# Patient Record
Sex: MALE | Race: WHITE | NOT HISPANIC OR LATINO | URBAN - METROPOLITAN AREA
[De-identification: names, ages, dates, MRNs, and addresses within clinical notes are randomized per-mention and may not be internally consistent; named-entity substitution may affect disease eponyms.]

---

## 2017-01-29 ENCOUNTER — COMMUNICATION - HEALTHEAST (OUTPATIENT)
Dept: INTERNAL MEDICINE | Facility: CLINIC | Age: 47
End: 2017-01-29

## 2017-01-30 ENCOUNTER — AMBULATORY - HEALTHEAST (OUTPATIENT)
Dept: INTERNAL MEDICINE | Facility: CLINIC | Age: 47
End: 2017-01-30

## 2017-02-02 ENCOUNTER — OFFICE VISIT - HEALTHEAST (OUTPATIENT)
Dept: VASCULAR SURGERY | Facility: CLINIC | Age: 47
End: 2017-02-02

## 2017-02-02 DIAGNOSIS — Z72.0 TOBACCO ABUSE: ICD-10-CM

## 2017-02-02 DIAGNOSIS — E66.09 NON MORBID OBESITY DUE TO EXCESS CALORIES: ICD-10-CM

## 2017-02-02 DIAGNOSIS — I87.303 VENOUS HYPERTENSION OF BOTH LOWER EXTREMITIES: ICD-10-CM

## 2017-02-02 DIAGNOSIS — R53.83 FATIGUE: ICD-10-CM

## 2017-02-02 DIAGNOSIS — R73.9 HYPERGLYCEMIA, UNSPECIFIED: ICD-10-CM

## 2017-02-02 DIAGNOSIS — E55.9 VITAMIN D DEFICIENCY: ICD-10-CM

## 2017-02-02 DIAGNOSIS — G62.9 PERIPHERAL NEUROPATHY: ICD-10-CM

## 2017-02-02 DIAGNOSIS — M79.89 LEG SWELLING: ICD-10-CM

## 2017-02-07 ENCOUNTER — COMMUNICATION - HEALTHEAST (OUTPATIENT)
Dept: SURGERY | Facility: CLINIC | Age: 47
End: 2017-02-07

## 2017-02-26 ENCOUNTER — AMBULATORY - HEALTHEAST (OUTPATIENT)
Dept: INTERNAL MEDICINE | Facility: CLINIC | Age: 47
End: 2017-02-26

## 2017-02-26 DIAGNOSIS — Z79.899 ENCOUNTER FOR LONG-TERM (CURRENT) USE OF OTHER MEDICATIONS: ICD-10-CM

## 2017-02-26 DIAGNOSIS — R73.9 HYPERGLYCEMIA, UNSPECIFIED: ICD-10-CM

## 2017-03-17 ENCOUNTER — COMMUNICATION - HEALTHEAST (OUTPATIENT)
Dept: SURGERY | Facility: CLINIC | Age: 47
End: 2017-03-17

## 2017-08-16 ENCOUNTER — OFFICE VISIT - HEALTHEAST (OUTPATIENT)
Dept: RHEUMATOLOGY | Facility: CLINIC | Age: 47
End: 2017-08-16

## 2017-08-16 DIAGNOSIS — M25.562 CHRONIC PAIN OF BOTH KNEES: ICD-10-CM

## 2017-08-16 DIAGNOSIS — M25.561 CHRONIC PAIN OF BOTH KNEES: ICD-10-CM

## 2017-08-16 DIAGNOSIS — M11.20 CHONDROCALCINOSIS: ICD-10-CM

## 2017-08-16 DIAGNOSIS — G89.29 CHRONIC PAIN OF BOTH KNEES: ICD-10-CM

## 2017-08-16 LAB
ALT SERPL W P-5'-P-CCNC: 35 U/L (ref 0–45)
CREAT SERPL-MCNC: 0.95 MG/DL (ref 0.7–1.3)
GFR SERPL CREATININE-BSD FRML MDRD: >60 ML/MIN/1.73M2

## 2017-08-16 ASSESSMENT — MIFFLIN-ST. JEOR: SCORE: 2206.63

## 2017-10-22 ENCOUNTER — COMMUNICATION - HEALTHEAST (OUTPATIENT)
Dept: RHEUMATOLOGY | Facility: CLINIC | Age: 47
End: 2017-10-22

## 2017-10-22 DIAGNOSIS — M25.562 CHRONIC PAIN OF BOTH KNEES: ICD-10-CM

## 2017-10-22 DIAGNOSIS — G89.29 CHRONIC PAIN OF BOTH KNEES: ICD-10-CM

## 2017-10-22 DIAGNOSIS — M11.20 CHONDROCALCINOSIS: ICD-10-CM

## 2017-10-22 DIAGNOSIS — M25.561 CHRONIC PAIN OF BOTH KNEES: ICD-10-CM

## 2017-11-26 ENCOUNTER — COMMUNICATION - HEALTHEAST (OUTPATIENT)
Dept: RHEUMATOLOGY | Facility: CLINIC | Age: 47
End: 2017-11-26

## 2017-11-26 DIAGNOSIS — M11.20 CHONDROCALCINOSIS: ICD-10-CM

## 2017-11-26 DIAGNOSIS — G89.29 CHRONIC PAIN OF BOTH KNEES: ICD-10-CM

## 2017-11-26 DIAGNOSIS — M25.561 CHRONIC PAIN OF BOTH KNEES: ICD-10-CM

## 2017-11-26 DIAGNOSIS — M25.562 CHRONIC PAIN OF BOTH KNEES: ICD-10-CM

## 2018-01-08 ENCOUNTER — COMMUNICATION - HEALTHEAST (OUTPATIENT)
Dept: RHEUMATOLOGY | Facility: CLINIC | Age: 48
End: 2018-01-08

## 2018-01-08 DIAGNOSIS — G89.29 CHRONIC PAIN OF BOTH KNEES: ICD-10-CM

## 2018-01-08 DIAGNOSIS — M11.20 CHONDROCALCINOSIS: ICD-10-CM

## 2018-01-08 DIAGNOSIS — M25.561 CHRONIC PAIN OF BOTH KNEES: ICD-10-CM

## 2018-01-08 DIAGNOSIS — M25.562 CHRONIC PAIN OF BOTH KNEES: ICD-10-CM

## 2018-02-20 ENCOUNTER — OFFICE VISIT - HEALTHEAST (OUTPATIENT)
Dept: RHEUMATOLOGY | Facility: CLINIC | Age: 48
End: 2018-02-20

## 2018-02-20 DIAGNOSIS — G89.29 CHRONIC PAIN OF BOTH KNEES: ICD-10-CM

## 2018-02-20 DIAGNOSIS — M25.562 CHRONIC PAIN OF BOTH KNEES: ICD-10-CM

## 2018-02-20 DIAGNOSIS — M17.0 BILATERAL PRIMARY OSTEOARTHRITIS OF KNEE: ICD-10-CM

## 2018-02-20 DIAGNOSIS — M25.561 CHRONIC PAIN OF BOTH KNEES: ICD-10-CM

## 2018-02-20 DIAGNOSIS — M11.20 CHONDROCALCINOSIS: ICD-10-CM

## 2018-02-20 LAB
ALBUMIN SERPL-MCNC: 3.6 G/DL (ref 3.5–5)
ALT SERPL W P-5'-P-CCNC: 61 U/L (ref 0–45)
CREAT SERPL-MCNC: 1 MG/DL (ref 0.7–1.3)
ERYTHROCYTE [DISTWIDTH] IN BLOOD BY AUTOMATED COUNT: 10.7 % (ref 11–14.5)
GFR SERPL CREATININE-BSD FRML MDRD: >60 ML/MIN/1.73M2
HCT VFR BLD AUTO: 45.9 % (ref 40–54)
HGB BLD-MCNC: 15.8 G/DL (ref 14–18)
MCH RBC QN AUTO: 30.6 PG (ref 27–34)
MCHC RBC AUTO-ENTMCNC: 34.4 G/DL (ref 32–36)
MCV RBC AUTO: 89 FL (ref 80–100)
PLATELET # BLD AUTO: 213 THOU/UL (ref 140–440)
PMV BLD AUTO: 8.2 FL (ref 7–10)
RBC # BLD AUTO: 5.16 MILL/UL (ref 4.4–6.2)
WBC: 6.1 THOU/UL (ref 4–11)

## 2018-02-20 RX ORDER — DICLOFENAC SODIUM 75 MG/1
75 TABLET, DELAYED RELEASE ORAL 2 TIMES DAILY
Qty: 180 TABLET | Refills: 1 | Status: SHIPPED | OUTPATIENT
Start: 2018-02-20 | End: 2018-05-21

## 2018-02-20 ASSESSMENT — MIFFLIN-ST. JEOR: SCORE: 2206.63

## 2018-07-05 ENCOUNTER — COMMUNICATION - HEALTHEAST (OUTPATIENT)
Dept: RHEUMATOLOGY | Facility: CLINIC | Age: 48
End: 2018-07-05

## 2018-07-05 DIAGNOSIS — M25.561 CHRONIC PAIN OF BOTH KNEES: ICD-10-CM

## 2018-07-05 DIAGNOSIS — M11.20 CHONDROCALCINOSIS: ICD-10-CM

## 2018-07-05 DIAGNOSIS — M25.562 CHRONIC PAIN OF BOTH KNEES: ICD-10-CM

## 2018-07-05 DIAGNOSIS — G89.29 CHRONIC PAIN OF BOTH KNEES: ICD-10-CM

## 2018-08-27 ENCOUNTER — OFFICE VISIT - HEALTHEAST (OUTPATIENT)
Dept: INTERNAL MEDICINE | Facility: CLINIC | Age: 48
End: 2018-08-27

## 2018-08-27 DIAGNOSIS — R73.9 HYPERGLYCEMIA: ICD-10-CM

## 2018-08-27 DIAGNOSIS — R35.0 URINARY FREQUENCY: ICD-10-CM

## 2018-08-27 DIAGNOSIS — E66.01 MORBID OBESITY (H): ICD-10-CM

## 2018-08-27 DIAGNOSIS — I10 ESSENTIAL HYPERTENSION: ICD-10-CM

## 2018-08-27 DIAGNOSIS — E55.9 VITAMIN D DEFICIENCY: ICD-10-CM

## 2018-08-27 DIAGNOSIS — Z72.0 TOBACCO ABUSE: ICD-10-CM

## 2018-08-27 DIAGNOSIS — R79.89 OTHER SPECIFIED ABNORMAL FINDINGS OF BLOOD CHEMISTRY: ICD-10-CM

## 2018-08-27 LAB
ALBUMIN SERPL-MCNC: 3.8 G/DL (ref 3.5–5)
ALBUMIN UR-MCNC: NEGATIVE MG/DL
ALP SERPL-CCNC: 86 U/L (ref 45–120)
ALT SERPL W P-5'-P-CCNC: 46 U/L (ref 0–45)
ANION GAP SERPL CALCULATED.3IONS-SCNC: 9 MMOL/L (ref 5–18)
APPEARANCE UR: CLEAR
AST SERPL W P-5'-P-CCNC: 29 U/L (ref 0–40)
BACTERIA #/AREA URNS HPF: ABNORMAL HPF
BILIRUB SERPL-MCNC: 0.5 MG/DL (ref 0–1)
BILIRUB UR QL STRIP: NEGATIVE
BUN SERPL-MCNC: 14 MG/DL (ref 8–22)
CALCIUM SERPL-MCNC: 9.8 MG/DL (ref 8.5–10.5)
CHLORIDE BLD-SCNC: 104 MMOL/L (ref 98–107)
CO2 SERPL-SCNC: 26 MMOL/L (ref 22–31)
COLOR UR AUTO: YELLOW
CREAT SERPL-MCNC: 1.01 MG/DL (ref 0.7–1.3)
GFR SERPL CREATININE-BSD FRML MDRD: >60 ML/MIN/1.73M2
GLUCOSE BLD-MCNC: 210 MG/DL (ref 70–125)
GLUCOSE UR STRIP-MCNC: ABNORMAL MG/DL
HBA1C MFR BLD: 8.5 % (ref 3.5–6)
HGB UR QL STRIP: ABNORMAL
KETONES UR STRIP-MCNC: NEGATIVE MG/DL
LEUKOCYTE ESTERASE UR QL STRIP: NEGATIVE
NITRATE UR QL: NEGATIVE
PH UR STRIP: 6 [PH] (ref 5–8)
POTASSIUM BLD-SCNC: 4.3 MMOL/L (ref 3.5–5)
PROT SERPL-MCNC: 7.4 G/DL (ref 6–8)
RBC #/AREA URNS AUTO: ABNORMAL HPF
SODIUM SERPL-SCNC: 139 MMOL/L (ref 136–145)
SP GR UR STRIP: 1.02 (ref 1–1.03)
SQUAMOUS #/AREA URNS AUTO: ABNORMAL LPF
UROBILINOGEN UR STRIP-ACNC: ABNORMAL
WBC #/AREA URNS AUTO: ABNORMAL HPF

## 2018-08-29 ENCOUNTER — AMBULATORY - HEALTHEAST (OUTPATIENT)
Dept: INTERNAL MEDICINE | Facility: CLINIC | Age: 48
End: 2018-08-29

## 2018-08-29 ENCOUNTER — COMMUNICATION - HEALTHEAST (OUTPATIENT)
Dept: INTERNAL MEDICINE | Facility: CLINIC | Age: 48
End: 2018-08-29

## 2018-09-19 ENCOUNTER — OFFICE VISIT - HEALTHEAST (OUTPATIENT)
Dept: SLEEP MEDICINE | Facility: CLINIC | Age: 48
End: 2018-09-19

## 2018-09-19 DIAGNOSIS — R06.83 SNORING: ICD-10-CM

## 2018-09-19 DIAGNOSIS — Z91.89 AT RISK FOR SLEEP APNEA: ICD-10-CM

## 2018-09-19 DIAGNOSIS — G47.10 HYPERSOMNIA: ICD-10-CM

## 2018-09-19 RX ORDER — DICLOFENAC SODIUM 75 MG/1
75 TABLET, DELAYED RELEASE ORAL 2 TIMES DAILY
Status: SHIPPED | COMMUNITY
Start: 2018-09-19

## 2018-09-19 ASSESSMENT — MIFFLIN-ST. JEOR: SCORE: 2215.7

## 2018-10-08 ENCOUNTER — RECORDS - HEALTHEAST (OUTPATIENT)
Dept: ADMINISTRATIVE | Facility: OTHER | Age: 48
End: 2018-10-08

## 2018-10-08 ENCOUNTER — RECORDS - HEALTHEAST (OUTPATIENT)
Dept: SLEEP MEDICINE | Facility: CLINIC | Age: 48
End: 2018-10-08

## 2018-10-08 DIAGNOSIS — Z91.89 OTHER SPECIFIED PERSONAL RISK FACTORS, NOT ELSEWHERE CLASSIFIED: ICD-10-CM

## 2018-10-08 DIAGNOSIS — R06.83 SNORING: ICD-10-CM

## 2018-10-08 DIAGNOSIS — G47.10 HYPERSOMNIA, UNSPECIFIED: ICD-10-CM

## 2018-10-15 ENCOUNTER — COMMUNICATION - HEALTHEAST (OUTPATIENT)
Dept: SLEEP MEDICINE | Facility: CLINIC | Age: 48
End: 2018-10-15

## 2018-10-15 ENCOUNTER — AMBULATORY - HEALTHEAST (OUTPATIENT)
Dept: SLEEP MEDICINE | Facility: CLINIC | Age: 48
End: 2018-10-15

## 2018-10-31 ENCOUNTER — OFFICE VISIT - HEALTHEAST (OUTPATIENT)
Dept: SLEEP MEDICINE | Facility: CLINIC | Age: 48
End: 2018-10-31

## 2018-10-31 ENCOUNTER — AMBULATORY - HEALTHEAST (OUTPATIENT)
Dept: SLEEP MEDICINE | Facility: CLINIC | Age: 48
End: 2018-10-31

## 2018-10-31 DIAGNOSIS — G47.33 OSA (OBSTRUCTIVE SLEEP APNEA): ICD-10-CM

## 2018-10-31 ASSESSMENT — MIFFLIN-ST. JEOR: SCORE: 2211.17

## 2018-11-09 ENCOUNTER — AMBULATORY - HEALTHEAST (OUTPATIENT)
Dept: SLEEP MEDICINE | Facility: CLINIC | Age: 48
End: 2018-11-09

## 2018-12-28 ENCOUNTER — COMMUNICATION - HEALTHEAST (OUTPATIENT)
Dept: INTERNAL MEDICINE | Facility: CLINIC | Age: 48
End: 2018-12-28

## 2019-01-10 ENCOUNTER — OFFICE VISIT - HEALTHEAST (OUTPATIENT)
Dept: SLEEP MEDICINE | Facility: CLINIC | Age: 49
End: 2019-01-10

## 2019-01-10 DIAGNOSIS — G47.33 OBSTRUCTIVE SLEEP APNEA: ICD-10-CM

## 2019-01-10 DIAGNOSIS — G47.69 SLEEP-RELATED MOVEMENT DISORDER: ICD-10-CM

## 2019-01-10 DIAGNOSIS — G47.10 HYPERSOMNIA: ICD-10-CM

## 2019-01-10 ASSESSMENT — MIFFLIN-ST. JEOR: SCORE: 2147.66

## 2019-04-04 ENCOUNTER — COMMUNICATION - HEALTHEAST (OUTPATIENT)
Dept: INTERNAL MEDICINE | Facility: CLINIC | Age: 49
End: 2019-04-04

## 2019-04-04 DIAGNOSIS — E11.9 TYPE 2 DIABETES MELLITUS (H): ICD-10-CM

## 2019-04-10 ENCOUNTER — COMMUNICATION - HEALTHEAST (OUTPATIENT)
Dept: INTERNAL MEDICINE | Facility: CLINIC | Age: 49
End: 2019-04-10

## 2019-05-09 ENCOUNTER — COMMUNICATION - HEALTHEAST (OUTPATIENT)
Dept: INTERNAL MEDICINE | Facility: CLINIC | Age: 49
End: 2019-05-09

## 2019-05-10 ENCOUNTER — COMMUNICATION - HEALTHEAST (OUTPATIENT)
Dept: INTERNAL MEDICINE | Facility: CLINIC | Age: 49
End: 2019-05-10

## 2019-05-29 ENCOUNTER — COMMUNICATION - HEALTHEAST (OUTPATIENT)
Dept: INTERNAL MEDICINE | Facility: CLINIC | Age: 49
End: 2019-05-29

## 2019-05-30 ENCOUNTER — COMMUNICATION - HEALTHEAST (OUTPATIENT)
Dept: INTERNAL MEDICINE | Facility: CLINIC | Age: 49
End: 2019-05-30

## 2019-06-11 ENCOUNTER — COMMUNICATION - HEALTHEAST (OUTPATIENT)
Dept: INTERNAL MEDICINE | Facility: CLINIC | Age: 49
End: 2019-06-11

## 2019-08-21 ENCOUNTER — COMMUNICATION - HEALTHEAST (OUTPATIENT)
Dept: INTERNAL MEDICINE | Facility: CLINIC | Age: 49
End: 2019-08-21

## 2019-08-23 ENCOUNTER — COMMUNICATION - HEALTHEAST (OUTPATIENT)
Dept: INTERNAL MEDICINE | Facility: CLINIC | Age: 49
End: 2019-08-23

## 2019-11-07 ENCOUNTER — HEALTH MAINTENANCE LETTER (OUTPATIENT)
Age: 49
End: 2019-11-07

## 2020-10-22 ENCOUNTER — OFFICE VISIT - HEALTHEAST (OUTPATIENT)
Dept: FAMILY MEDICINE | Facility: CLINIC | Age: 50
End: 2020-10-22

## 2020-10-22 DIAGNOSIS — H01.005 BLEPHARITIS OF LEFT LOWER EYELID, UNSPECIFIED TYPE: ICD-10-CM

## 2020-10-22 DIAGNOSIS — R35.0 URINARY FREQUENCY: ICD-10-CM

## 2020-10-22 DIAGNOSIS — I10 ESSENTIAL HYPERTENSION, BENIGN: ICD-10-CM

## 2020-10-22 DIAGNOSIS — E11.9 TYPE 2 DIABETES MELLITUS WITHOUT COMPLICATION, WITHOUT LONG-TERM CURRENT USE OF INSULIN (H): ICD-10-CM

## 2020-10-22 DIAGNOSIS — E66.01 MORBID OBESITY (H): ICD-10-CM

## 2020-10-22 LAB
ALBUMIN SERPL-MCNC: 3.9 G/DL (ref 3.5–5)
ALBUMIN UR-MCNC: NEGATIVE MG/DL
ALP SERPL-CCNC: 159 U/L (ref 45–120)
ALT SERPL W P-5'-P-CCNC: 65 U/L (ref 0–45)
ANION GAP SERPL CALCULATED.3IONS-SCNC: 14 MMOL/L (ref 5–18)
APPEARANCE UR: CLEAR
AST SERPL W P-5'-P-CCNC: 33 U/L (ref 0–40)
BACTERIA #/AREA URNS HPF: ABNORMAL HPF
BILIRUB SERPL-MCNC: 0.2 MG/DL (ref 0–1)
BILIRUB UR QL STRIP: NEGATIVE
BUN SERPL-MCNC: 14 MG/DL (ref 8–22)
CALCIUM SERPL-MCNC: 9.6 MG/DL (ref 8.5–10.5)
CHLORIDE BLD-SCNC: 100 MMOL/L (ref 98–107)
CO2 SERPL-SCNC: 23 MMOL/L (ref 22–31)
COLOR UR AUTO: YELLOW
CREAT SERPL-MCNC: 1.12 MG/DL (ref 0.7–1.3)
GFR SERPL CREATININE-BSD FRML MDRD: >60 ML/MIN/1.73M2
GLUCOSE BLD-MCNC: 395 MG/DL (ref 70–125)
GLUCOSE UR STRIP-MCNC: ABNORMAL MG/DL
HBA1C MFR BLD: 11.1 %
HGB UR QL STRIP: ABNORMAL
KETONES UR STRIP-MCNC: NEGATIVE MG/DL
LEUKOCYTE ESTERASE UR QL STRIP: NEGATIVE
NITRATE UR QL: NEGATIVE
PH UR STRIP: 6 [PH] (ref 5–8)
POTASSIUM BLD-SCNC: 4.7 MMOL/L (ref 3.5–5)
PROT SERPL-MCNC: 7.8 G/DL (ref 6–8)
RBC #/AREA URNS AUTO: ABNORMAL HPF
SODIUM SERPL-SCNC: 137 MMOL/L (ref 136–145)
SP GR UR STRIP: 1.01 (ref 1–1.03)
SQUAMOUS #/AREA URNS AUTO: ABNORMAL LPF
UROBILINOGEN UR STRIP-ACNC: ABNORMAL
WBC #/AREA URNS AUTO: ABNORMAL HPF

## 2020-10-22 RX ORDER — LISINOPRIL 20 MG/1
20 TABLET ORAL DAILY
Qty: 30 TABLET | Refills: 2 | Status: SHIPPED | OUTPATIENT
Start: 2020-10-22

## 2020-10-22 ASSESSMENT — MIFFLIN-ST. JEOR: SCORE: 2179.87

## 2020-10-23 LAB
CREAT UR-MCNC: 33.1 MG/DL
MICROALBUMIN UR-MCNC: <0.5 MG/DL (ref 0–1.99)
MICROALBUMIN/CREAT UR: NORMAL MG/G{CREAT}

## 2020-10-25 ENCOUNTER — AMBULATORY - HEALTHEAST (OUTPATIENT)
Dept: FAMILY MEDICINE | Facility: CLINIC | Age: 50
End: 2020-10-25

## 2020-10-25 DIAGNOSIS — E11.9 TYPE 2 DIABETES MELLITUS WITHOUT COMPLICATION, WITHOUT LONG-TERM CURRENT USE OF INSULIN (H): ICD-10-CM

## 2020-10-25 RX ORDER — METFORMIN HCL 500 MG
TABLET, EXTENDED RELEASE 24 HR ORAL
Qty: 120 TABLET | Refills: 2 | Status: SHIPPED | OUTPATIENT
Start: 2020-10-25

## 2020-12-06 ENCOUNTER — HEALTH MAINTENANCE LETTER (OUTPATIENT)
Age: 50
End: 2020-12-06

## 2021-05-29 NOTE — TELEPHONE ENCOUNTER
LMTCB.  Pt's health maintenance indicates they are due for a diabetic eye exam. This is important to manage any signs/symptoms of diabetic retinopathy. Has pt had a diabetic eye exam within the past year?  If so, where?  Clinic will call and request records.  If not, would pt like a referral?

## 2021-05-31 VITALS — BODY MASS INDEX: 37.77 KG/M2 | WEIGHT: 285 LBS | HEIGHT: 73 IN

## 2021-05-31 NOTE — TELEPHONE ENCOUNTER
LMTCB.  Pt is due for an appt with PCP, has not been seen since 8/27/18.  Please assist pt in scheduling an appt.

## 2021-05-31 NOTE — TELEPHONE ENCOUNTER
It looks like pt should be on lisinopril or losartan per last note  It looks like it may have ?    PCP Please advise

## 2021-05-31 NOTE — TELEPHONE ENCOUNTER
LMTCB Please inform pt that he is over due for DM follow up with pcp and DM eye exam   And Microalbumin     Please help schedule pt

## 2021-05-31 NOTE — TELEPHONE ENCOUNTER
LMTCB help schedule pt for DM follow up with pcp and see if and where the pt has had his last eye exam

## 2021-06-01 VITALS — BODY MASS INDEX: 37.77 KG/M2 | HEIGHT: 73 IN | WEIGHT: 285 LBS

## 2021-06-01 VITALS — WEIGHT: 289.3 LBS | BODY MASS INDEX: 38.17 KG/M2

## 2021-06-02 VITALS — HEIGHT: 73 IN | BODY MASS INDEX: 36.05 KG/M2 | WEIGHT: 272 LBS

## 2021-06-02 VITALS — HEIGHT: 73 IN | BODY MASS INDEX: 38.04 KG/M2 | WEIGHT: 287 LBS

## 2021-06-02 VITALS — BODY MASS INDEX: 37.91 KG/M2 | WEIGHT: 286 LBS | HEIGHT: 73 IN

## 2021-06-05 ENCOUNTER — HEALTH MAINTENANCE LETTER (OUTPATIENT)
Age: 51
End: 2021-06-05

## 2021-06-05 VITALS
RESPIRATION RATE: 20 BRPM | SYSTOLIC BLOOD PRESSURE: 170 MMHG | OXYGEN SATURATION: 98 % | HEART RATE: 118 BPM | BODY MASS INDEX: 36.99 KG/M2 | HEIGHT: 73 IN | TEMPERATURE: 96.2 F | DIASTOLIC BLOOD PRESSURE: 98 MMHG | WEIGHT: 279.1 LBS

## 2021-06-08 NOTE — PROGRESS NOTES
"Date of Service: 02/02/2017     Date last seen by Dr. Mack:  10/13/2016      PCP: Velasquez Templeton MD      Impression:  1. Leg swelling bilaterally  2. Venous hypertension with insufficiency of the legs bilaterally  3. Early signs of peripheral neuropathy  4. Valgus heel with pes planus bilaterally  5. Knee pain with pseudogout  6. Obesity  7. Risk of AVA and DM  8. Tobacco abuse     Plan:  1. Type of swelling was reviewed in detail with the patient. Questions were answered and education was completed.  2. Contribution of tobacco use to multiple medical problems reviewed.  3. Continue exercises and compression to control swelling. New compression written for.  4. Legs were measured.  [place] catalogue was provided with recommendations for compression stockings.(126/162/111/120/125/150 XL) to supplement what he has.   5. Bariatric medicine referral written again.   6. Continue multivitamin daily.  7. Labs to see if swelling contribution: Vit D, HgA1c with PN.  Will draw today.  8. Patient will follow up in 8 months or when needed.      Time spent with patient 25 minutes with greater than 50% time in consultation, education and coordination of care.     ---------------------------------------------------------------------------------------------------------------------    Chief Complaint: Bilateral leg swelling     History of Present Illness:     Chandler Dubon returns to the [facility] with bilateral leg swelling due to venous hypertension complicated by tobacco abuse (1 pk/3day).  I asked for labs to be done to check HgA1C and Vit D.  These were not done. \"I forgot\".  He did not get the venous study.  He has not changed his smoking habits.  He went to therapy for the swelling and that helped a lot.  He had two pairs of compression socks and was wearing them daily until he lost a sock and now hasn't worn them regularly and hasn't had them on for two days.  His swelling is understandably up.  " There has been no new numbness, tingling or weakness. There have been no new masses, rashes, or swellings of any other joints. There has been no new decrease in appetite, unexplained weight loss, abdominal bloating and bowel or bladder changes. He continues to work with a  1-2 times a week and goes to the gym once more on his own.  I also had to referred him to bariatrics which he did not do. He is taking an MVI daily now.       Past Medical History:   Diagnosis Date     Arthritis      Hematuria     Created by Conversion      HTN (hypertension)      Obesity        Past Surgical History:   Procedure Laterality Date     KNEE SURGERY Left 1980    tendon and patella repair         Current Outpatient Prescriptions:      losartan (COZAAR) 50 MG tablet, Take 1 tablet (50 mg total) by mouth daily., Disp: 30 tablet, Rfl: 11     spironolactone-hydrochlorothiazide (ALDACTAZIDE) 25-25 mg tablet, Take 1 tablet by mouth daily., Disp: 90 tablet, Rfl: 3     ibuprofen (ADVIL,MOTRIN) 200 MG tablet, Take 800 mg by mouth every 6 (six) hours as needed for pain., Disp: , Rfl:     No Known Allergies    Social History     Social History     Marital status:      Spouse name: N/A     Number of children: N/A     Years of education: N/A     Occupational History     Not on file.     Social History Main Topics     Smoking status: Current Every Day Smoker     Packs/day: 0.50     Years: 27.00     Smokeless tobacco: Not on file     Alcohol use Yes      Comment: maybe 2 drinks/week      Drug use: No     Sexual activity: Yes     Partners: Female     Birth control/ protection: Post-menopausal     Other Topics Concern     Not on file     Social History Narrative    Lives in a house. 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  No kids.        Family History   Problem Relation Age of Onset     Dementia Mother      Cirrhosis Father      No Medical Problems Sister      No Medical Problems  Brother        Review of Systems:  Chandler Dubon no new numbess, tingling or weakness, redness or rashes, fevers, new masses, abdominal bloating or discomfort, unexplained weight loss, increased pain, new ulcers, shortness of breath and chest pain  Full 12 point review of systems was completed.    Imaging:  None    Labs:  No results found.  Lab Results   Component Value Date    SEDRATE 12 09/19/2016         Lab Results   Component Value Date    CRP 0.7 09/19/2016           Lab Results   Component Value Date    CREATININE 1.04 03/23/2016      Lab Results   Component Value Date    HGBA1C 6.3 (H) 08/06/2013           Lab Results   Component Value Date    BUN 23 (H) 03/23/2016              Lab Results   Component Value Date    ALBUMIN 3.6 03/15/2016       No results found for: [id number]      Physical Exam:  Vitals:    02/02/17 0955   BP: 100/62   Pulse: 72   Resp: 18   Temp: 99.3  F (37.4  C)    There is no height or weight on file to calculate BMI.    Circumferential measures:    Vasc Edema 10/13/2016 2/2/2017   Right just above MTP 25.5 24.5   Right Ankle 29.0 29   Right Widest Calf 46.5 47.2   Right Thigh Up 10cm .49.0 49   Left - just above MTP 25.0 25   Left Ankle 29.5 27.3   Left Widest Calf 46.5 47   Left Thigh Up 10cm 47.5 47.5     Measures fairly stable, slightly down.    General:  46 y.o. male in no apparent distress.  Alert and oriented x 3.  Cooperative. Affect normal.    Musculoskeletal: Normal range of motion in hips, knees and ankles bilaterally throughout . There is no active joint synovitis, erythema, swelling or joint laxity.       Neurological: Sensation is intact to pin prick and light touch in both legs and decreased on monofilament testing in the feet bilaterally. Strength testing is normal in hip flexion, knee flexion, knee extension, ankle dorsiflexion and great toe extension bilaterally.       Vascular: Dorsalis pedis and posterior tibialis pulses are strong and equal bilaterally. There are  some telangietasias, medial ankle venous flares, venous varicosities and spider veins . There is normal capillary refill.      Integumentary: Skin of the legs is uniformly warm and dry, and hyperpigmentated and with positive Stemmer's Sign . Stemmer's sign is very slight. There is some fibrosis of the feet and toes. Nails are normal.      Etta Mack MD, ABWMS, FACCWS, [facility]  Medical Director Wound Care and Lymphedema  [facility]  [phone]

## 2021-06-12 NOTE — PROGRESS NOTES
ASSESSMENT AND PLAN:  Chandler Dubon 47 y.o. male  roof and siding material salesperson, smoker, is here for follow-up after an extended hiatus.  He complains of ongoing pain.  That he has osteoarthritis of the knees is well-defined.  The key question is what if any type of, inflammatory arthropathy that is adding to his pain and how to approach that.  X-rays of the knees apart from showing osteoarthritic changes show chondrocalcinosis.  This in of itself I explained to him does not necessarily make the diagnosis of pseudogout.  Although a very strong indicator, I would wait till an effusion has been obtained which at least is been shown to be inflammatory.  He had corticosteroid injections by another rheumatologist and was not all that impressed.  It is unclear to me whether he did or did not have good response as it was such a Lyme long time ago.  Of the various options he is inclined to proceed with a nonsteroidals.  Check labs as noted.  I examined his knees with ultrasound there was a minuscule amount of effusion not suitable for aspiration.  We discussed the importance of regular follow-up as recommended for clarification of some these issues.  Return for follow-up here in 2 months.  Diagnoses and all orders for this visit:    Chronic pain of both knees  -     HM1(CBC and Differential)  -     Creatinine  -     ALT (SGPT)  -     Albumin  -     C-Reactive Protein  -     Erythrocyte Sedimentation Rate  -     HM1 (CBC with Diff)  -     diclofenac (VOLTAREN) 75 MG EC tablet; Take 1 tablet (75 mg total) by mouth 2 (two) times a day.  Dispense: 60 tablet; Refill: 1    Chondrocalcinosis  -     HM1(CBC and Differential)  -     Creatinine  -     ALT (SGPT)  -     Albumin  -     C-Reactive Protein  -     Erythrocyte Sedimentation Rate  -     HM1 (CBC with Diff)  -     diclofenac (VOLTAREN) 75 MG EC tablet; Take 1 tablet (75 mg total) by mouth 2 (two) times a day.  Dispense: 60 tablet; Refill: 1    HISTORY OF PRESENTING  ILLNESS:  Chandler Dubon, 47 y.o., male is here for joint pains.  Joints affected include both knee(s). This has gone on for a few weeks ago. Pain is described as sharp and shooting. It is when active.  His symptoms are moderate, severe. The symptoms are gradually improving. Symptoms include pain, swelling, limited range of motion.  Treatment to date has been with significant relief.  He was seen in internal medicine.  Started on meloxicam.  He has noted pain of 6/10.  He also has hypertension in the background.  He feels the swelling has subsided significantly.  Although he has noted these improvements he still has significant residual pain as he ambulates in the examination room.  He reports no other joint area such as a small joints of the hands, elbows shoulders hips ankles or feet MTP areas are affected.  He did have ankle swelling to begin with which has subsided.  His work involves getting up and down the road.  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  He does not come across or interacts with younger children.  He R his family does not have history of psoriasis ulcerative colitis or Crohn's disease.  On occasion he is been woken up from sleep because of this.  He has noted no rash, mouth ulcers, photosensitivity pleuritic symptoms seizure disorder Raynauds.  He has no history of DVT PE.  He is a smoker.  Further historical information and ADL limitations as noted in the multidimensional health assessment questionnaire attached in the EMR.    ALLERGIES:Review of patient's allergies indicates no known allergies.    PAST MEDICAL/ACTIVE PROBLEMS/MEDICATION/ FAMILY HISTORY/SOCIAL DATA:  The patient has a family history of  Past Medical History:   Diagnosis Date     Arthritis      Hematuria     Created by Conversion      HTN (hypertension)      Obesity      History   Smoking Status     Current Every Day Smoker     Packs/day: 0.50     Years: 27.00   Smokeless Tobacco     Not on file     Patient Active  "Problem List   Diagnosis     Non morbid obesity due to excess calories     Essential Hypertension     Edema     Abnormal Liver Function Test     Arthralgia     Pseudogout     Venous hypertension of both lower extremities     Peripheral neuropathy     Hyperglycemia, unspecified     Vitamin D deficiency     Tobacco abuse     Current Outpatient Prescriptions   Medication Sig Dispense Refill     ibuprofen (ADVIL,MOTRIN) 200 MG tablet Take 800 mg by mouth every 6 (six) hours as needed for pain.       No current facility-administered medications for this visit.      DETAILED EXAMINATION  08/16/17  :  Vitals:    08/16/17 1439   BP: 158/88   Patient Site: Left Arm   Patient Position: Sitting   Cuff Size: Adult Regular   Pulse: 84   Weight: (!) 285 lb (129.3 kg)   Height: 6' 1\" (1.854 m)     Alert oriented. Head including the face is examined for malar rash, heliotropes, scarring, lupus pernio. Eyes examined for redness such as in episcleritis/scleritis, periorbital lesions.   Neck examined  for lymph nodes, range of motion Both upper and lower extremities (all four) examined for swollen, warm &/or  tender joints, range of motion, rash, muscle weakness, edema. The salient normal / abnormal findings are appended.    Minimal effusion of the left knee with warmth.  He does not have synovitis in any of the other palpable appendicular joints.  He has full range of motion of the shoulders, back, hip joints.  He does not have nail pitting.  There is no dactylitis, no enthesitis.    LAB / IMAGING DATA:  ALT   Date Value Ref Range Status   03/15/2016 42 12 - 78 U/L Final   08/06/2013 76 12 - 78 U/L Final     Comment:     New ALT test method with new reference range as of 6/4/12   12/15/2011 75 (H) 30 - 65 U/L Final     Albumin   Date Value Ref Range Status   03/15/2016 3.6 3.5 - 5.0 g/dL Final   08/06/2013 3.5 3.4 - 5.0 g/dL Final   12/15/2011 3.6 3.4 - 5.0 g/dL Final     Creatinine   Date Value Ref Range Status   03/23/2016 1.04 " 0.70 - 1.30 mg/dL Final     Comment:       New Creatinine method with new reference ranges as of 9/14/15   03/15/2016 1.10 0.70 - 1.30 mg/dL Final     Comment:       New Creatinine method with new reference ranges as of 9/14/15   08/01/2014 1.20 0.60 - 1.30 mg/dL Final       WBC   Date Value Ref Range Status   03/15/2016 8.2 4.0 - 11.0 thou/uL Final   08/01/2014 6.9 4.0 - 11.0 thou/uL Final   08/06/2013 5.2 4.0 - 11.0 thou/uL Final     Hemoglobin   Date Value Ref Range Status   03/15/2016 14.7 14.0 - 18.0 g/dL Final   08/01/2014 15.3 14.0 - 18.0 g/dL Final   08/06/2013 15.3 14.0 - 18.0 g/dL Final     Platelets   Date Value Ref Range Status   03/15/2016 205 140 - 440 thou/uL Final   08/01/2014 238 140 - 440 thou/uL Final   08/06/2013 234 140 - 440 thou/uL Final       Lab Results   Component Value Date    RF <15.0 03/23/2016    SEDRATE 12 09/19/2016

## 2021-06-12 NOTE — PROGRESS NOTES
Subjective     Chandler Dubon is a 50 y.o. male who presents to clinic today for the following health issues:    HPI   1. Left lower eyelid swelling -patient reports for the last 4 days he has had gradually worsening left lower eyelid swelling and redness.  He notes it to be somewhat itchy but not painful.  He has not appreciated significant spreading of the redness over the last couple of days.  He is able to completely close his eyelid.  No measured fevers.  No associated nasal congestion, change in vision, sore throat, cough, or lymph node swelling noted.  No history of similar episodes in the past.  Patient does not recall any trauma to the eyelid previously.  He has had styes in the past though this is somewhat more swollen than he recalls previous styes being.  He has not been using any over-the-counter treatments for the eye.    2.  Hypertension-patient has not been seen in clinic for the past 2 years and notes that since his last visit, his PCP has retired. He offers no specific reason for his absence from medical care, but reports that he is now interested in resuming treatments for previously diagnosed hypertension and type 2 diabetes. No chest pain, palpitations, shortness of breath, or peripheral swelling.  In the past patient had been prescribed lisinopril though he does not believe that he took this medication for very long.  He does not recall any significant side effects from it.    3. Type 2 diabetes - patient was last seen by primary care in August of 2018. At that visit he was given diagnosis of type 2 diabetes based on hemoglobin A1c which was elevated at 8.5%.  Patient reports that he was referred to diabetes education but does not recall meeting with them.  There is a prescription noted in his chart for metformin but patient does not recall taking this in the past.  He has not been checking home blood sugars, but over the past few weeks has noted significant polyuria, which prompted concern for  "elevated blood sugars.     Patient Active Problem List   Diagnosis     Essential Hypertension     Abnormal Liver Function Test     Pseudogout     Venous hypertension of both lower extremities     Peripheral neuropathy     Hyperglycemia, unspecified     Vitamin D deficiency     Tobacco abuse     Chronic pain of both knees     Chondrocalcinosis     Diabetes mellitus, type 2 (H)     Obstructive sleep apnea     Obesity (BMI 35.0-39.9) with comorbidity (H)     Past Surgical History:   Procedure Laterality Date     KNEE SURGERY Left 1980    tendon and patella repair       Social History     Tobacco Use     Smoking status: Current Every Day Smoker     Packs/day: 0.50     Years: 27.00     Pack years: 13.50     Smokeless tobacco: Never Used   Substance Use Topics     Alcohol use: Yes     Comment: maybe 2 drinks/week      Family History   Problem Relation Age of Onset     Dementia Mother      Diabetes type II Mother      Cirrhosis Father      No Medical Problems Sister      No Medical Problems Brother      Diabetes type II Maternal Uncle          Current Outpatient Medications   Medication Sig Dispense Refill     azithromycin (AZASITE) 1 % ophthalmic solution Administer 1 drop into the left eye 2 (two) times a day for 10 days. 2.5 mL 0     diclofenac (VOLTAREN) 75 MG EC tablet Take 75 mg by mouth 2 (two) times a day.       No Known Allergies      Review of Systems   Negative except as noted above in HPI.       Objective    BP (!) 170/98 (Patient Site: Right Arm, Patient Position: Sitting, Cuff Size: Adult Large)   Pulse (!) 118   Temp (!) 96.2  F (35.7  C) (Tympanic)   Resp 20   Ht 6' 1\" (1.854 m)   Wt (!) 279 lb 1.6 oz (126.6 kg)   SpO2 98%   BMI 36.82 kg/m    Body mass index is 36.82 kg/m .  Physical Exam   General: Awake/alert.  Oriented to person and place.  No apparent distress.  Eyes: Conjunctiva normal bilaterally.  Left lower eyelid is diffusely swollen and erythematous.  Patient is however able to close the " eyelids completely. After pulling down on the eyelid, there appears to be scant purulent material visible without clearly visible pustule  Neck: Supple without significant anterior cervical lymph node enlargement or thyroid enlargement.  CV: Regular rate S1/S2  Respiratory: Lungs clear bilaterally.  Extremities: No pitting edema noted in the lower extremities.    Labs: pending    Assessment & Plan     Problem List Items Addressed This Visit        Edg Concept Cardiac Problems    Diabetes mellitus, type 2 (H)    Relevant Orders    Glycosylated Hemoglobin A1c (Completed)    Microalbumin, Random Urine       Other    Obesity (BMI 35.0-39.9) with comorbidity (H)      Other Visit Diagnoses     Essential hypertension, benign    -  Primary    Relevant Medications    lisinopriL (PRINIVIL,ZESTRIL) 20 MG tablet    Other Relevant Orders    Comprehensive Metabolic Panel (Completed)    Urinary frequency        Relevant Orders    Urinalysis-UC if Indicated (Completed)    Blepharitis of left lower eyelid, unspecified type        Relevant Medications    azithromycin (AZASITE) 1 % ophthalmic solution        Recommend labs as per orders to further gauge status of patient's diabetes.  Given elevated blood pressure reading, recommend patient start lisinopril 20mg daily potential side effects are reviewed in detail.  Patient is advised to contact me if experiencing any significant dry cough after starting this medicine.  We will check the patient's electrolytes and kidney function today prior to his first dose of lisinopril.  Check UA and UC regarding patient's history of recent urinary frequency.    For left lower eyelid blepharitis recommend eyelid hygiene with cleansing 2-3 times a day using a cotton swab dipped in solution of baby shampoo and clean water.  In addition recommend azithromycin ophthalmic drops as per orders.  Patient is advised to follow-up if noting no improvement in the eye within 48 hours.    Recommend patient  schedule follow-up visit in 2 weeks at which time blood pressure can be rechecked and potentially blood tests to ensure patient's kidney function and electrolytes remain stable.    Return in about 2 weeks (around 11/5/2020) for Follow up.    Patient is advised to otherwise return to clinic for further evaluation if not responding to recommended treatments or if noting any new or worsening symptoms.     Stan Lopez MD  [facility]

## 2021-06-12 NOTE — PATIENT INSTRUCTIONS - HE
Please start on lisinopril 20 mg daily for high blood pressure.     Also please start using antibiotic eye drop twice daily for 10 days for the infected eyelid.  Also please cleanse the lower left eyelid with baby shampoo / water saturated q-tip 2-3 times daily.      We'll perform some labs to check on the status of diabetes and I'll let you know the results by tomorrow.

## 2021-06-16 NOTE — PROGRESS NOTES
ASSESSMENT AND PLAN:  Chandler Dubon 47 y.o. male  roof and siding material salesperson, smoker, is here for follow-up.  He has primary osteoarthritis of both knees, chondrocalcinosis.  He does not seem to have had episode of acute inflammatory arthropathy such as acute pseudogout.  He is done great with diclofenac 75 mg twice daily and no obvious side effects, reviewed.  Continue the current approach.  Check labs as noted.  Follow-up in 6 months.      Diagnoses and all orders for this visit:    Chronic pain of both knees  -     diclofenac (VOLTAREN) 75 MG EC tablet; Take 1 tablet (75 mg total) by mouth 2 (two) times a day.  Dispense: 180 tablet; Refill: 1  -     ALT (SGPT)  -     Albumin  -     Creatinine  -     HM2(CBC w/o Differential)    Chondrocalcinosis  -     diclofenac (VOLTAREN) 75 MG EC tablet; Take 1 tablet (75 mg total) by mouth 2 (two) times a day.  Dispense: 180 tablet; Refill: 1    Bilateral primary osteoarthritis of knee      HISTORY OF PRESENTING ILLNESS:  Chandler Dubon, 47 y.o., male is here for follow-up of polyarthralgias in association with osteoarthritis chondrocalcinosis.  He has noted that with diclofenac is done great.  He noted pain level of 0.5/10.  He takes it most days.  There is no pain swelling or other findings in the joints.  He reports no acute episodes of joint pains.  He is able to do all his day-to-day activities without difficulty.    He also has hypertension in the background.  He feels the swelling has subsided significantly.  Although he has noted these improvements he still has significant residual pain as he ambulates in the examination room.  He reports no other joint area such as a small joints of the hands, elbows shoulders hips ankles or feet MTP areas are affected.  He did have ankle swelling to begin with which has subsided.  His work involves getting up and down the road.  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  He does not come across or interacts  "with younger children.  There is no family history of psoriasis ulcerative colitis or Crohn's disease.     He has noted no rash, mouth ulcers, photosensitivity pleuritic symptoms seizure disorder Raynauds.  He has no history of DVT PE.  He is a smoker.  Further historical information and ADL limitations as noted in the multidimensional health assessment questionnaire attached in the EMR.    ALLERGIES:Review of patient's allergies indicates no known allergies.    PAST MEDICAL/ACTIVE PROBLEMS/MEDICATION/ FAMILY HISTORY/SOCIAL DATA:  The patient has a family history of  Past Medical History:   Diagnosis Date     Arthritis      Hematuria     Created by Conversion      HTN (hypertension)      Obesity      History   Smoking Status     Current Every Day Smoker     Packs/day: 0.50     Years: 27.00   Smokeless Tobacco     Never Used     Patient Active Problem List   Diagnosis     Non morbid obesity due to excess calories     Essential Hypertension     Edema     Abnormal Liver Function Test     Arthralgia     Pseudogout     Venous hypertension of both lower extremities     Peripheral neuropathy     Hyperglycemia, unspecified     Vitamin D deficiency     Tobacco abuse     Chronic pain of both knees     Chondrocalcinosis     Current Outpatient Prescriptions   Medication Sig Dispense Refill     diclofenac (VOLTAREN) 75 MG EC tablet TAKE 1 TABLET BY MOUTH TWICE DAILY 60 tablet 1     No current facility-administered medications for this visit.      DETAILED EXAMINATION  02/20/18  :  Vitals:    02/20/18 1031   BP: 158/82   Pulse: (!) 108   SpO2: 98%   Weight: (!) 285 lb (129.3 kg)   Height: 6' 1\" (1.854 m)     Alert oriented. Head including the face is examined for malar rash, heliotropes, scarring, lupus pernio. Eyes examined for redness such as in episcleritis/scleritis, periorbital lesions.   Neck/ Face examined for parotid gland swelling, range of motion of neck.  Left upper and lower and right upper and lower extremities " examined for tenderness, swelling, warmth of the appendicular joints, range of motion, edema, rash.  Some of the important findings included: There is no synovitis in any of the palpable appendicular joints.  The knees are without joint line tenderness without warmth or effusion.  He does not have digital dactylitis. LAB / IMAGING DATA:  ALT   Date Value Ref Range Status   08/16/2017 35 0 - 45 U/L Final   03/15/2016 42 12 - 78 U/L Final   08/06/2013 76 12 - 78 U/L Final     Comment:     New ALT test method with new reference range as of 6/4/12     Albumin   Date Value Ref Range Status   08/16/2017 3.9 3.5 - 5.0 g/dL Final   03/15/2016 3.6 3.5 - 5.0 g/dL Final   08/06/2013 3.5 3.4 - 5.0 g/dL Final     Creatinine   Date Value Ref Range Status   08/16/2017 0.95 0.70 - 1.30 mg/dL Final   03/23/2016 1.04 0.70 - 1.30 mg/dL Final     Comment:       New Creatinine method with new reference ranges as of 9/14/15   03/15/2016 1.10 0.70 - 1.30 mg/dL Final     Comment:       New Creatinine method with new reference ranges as of 9/14/15       WBC   Date Value Ref Range Status   08/16/2017 7.8 4.0 - 11.0 thou/uL Final   03/15/2016 8.2 4.0 - 11.0 thou/uL Final   08/01/2014 6.9 4.0 - 11.0 thou/uL Final     Hemoglobin   Date Value Ref Range Status   08/16/2017 15.3 14.0 - 18.0 g/dL Final   03/15/2016 14.7 14.0 - 18.0 g/dL Final   08/01/2014 15.3 14.0 - 18.0 g/dL Final     Platelets   Date Value Ref Range Status   08/16/2017 236 140 - 440 thou/uL Final   03/15/2016 205 140 - 440 thou/uL Final   08/01/2014 238 140 - 440 thou/uL Final       Lab Results   Component Value Date    RF <15.0 03/23/2016    SEDRATE 23 (H) 08/16/2017

## 2021-06-17 NOTE — PATIENT INSTRUCTIONS - HE
"Patient Instructions by Fan Harry DO at 1/10/2019  9:00 AM     Author: Fan Harry DO Service: -- Author Type: Physician    Filed: 1/10/2019  9:35 AM Encounter Date: 1/10/2019 Status: Addendum    : Fan Harry DO (Physician)    Related Notes: Original Note by Fan Harry DO (Physician) filed at 1/10/2019  9:34 AM         What is sleep apnea?    Sleep apnea is a condition that makes you stop breathing for short periods while you are asleep. There are 2 types of sleep apnea. One is called \"obstructive sleep apnea,\" and the other is called \"central sleep apnea.\"  In obstructive sleep apnea, you stop breathing because your throat narrows or closes (figure 1). In central sleep apnea, you stop breathing because your brain does not send the right signals to your muscles to make you breathe. When people talk about sleep apnea, they are usually referring to obstructive sleep apnea, which is what this article is about.  People with sleep apnea do not know that they stop breathing when they are asleep. But they do sometimes wake up startled or gasping for breath. They also often hear from loved ones that they snore.  What are the symptoms of sleep apnea? -- The main symptoms of sleep apnea are loud snoring, tiredness, and daytime sleepiness. Other symptoms can include:  ?Restless sleep  ?Waking up choking or gasping  ?Morning headaches, dry mouth, or sore throat  ?Waking up often to urinate  ?Waking up feeling unrested or groggy  ?Trouble thinking clearly or remembering things  Some people with sleep apnea don't have symptoms, or they don't know they have them. They might figure that it's normal to be tired or to snore a lot.  Should I see a doctor or nurse? -- Yes. If you think you might have sleep apnea, see your doctor.  Is there a test for sleep apnea? -- Yes. If your doctor or nurse suspects you have sleep apnea, he or she might send you for a \"sleep study.\" Sleep studies can sometimes be " "done at home, but they are usually done in a sleep lab. For the study, you spend the night in the lab, and you are hooked up to different machines that monitor your heart rate, breathing, and other body functions. The results of the test will tell your doctor or nurse if you have the disorder.  Is there anything I can do on my own to help my sleep apnea? -- Yes. Here are some things that might help:  ?Stay off your back when sleeping. (This is not always practical, because people cannot control their position while asleep. Plus, it only helps some people.)  ?Lose weight, if you are overweight  ?Avoid alcohol, because it can make sleep apnea worse  How is sleep apnea treated? -- The most effective treatment for sleep apnea is a device that keeps your airway open while you sleep. Treatment with this device is called \"continuous positive airway pressure,\" or CPAP. People getting CPAP wear a face mask at night that keeps them breathing (figure 2).  If your doctor or nurse recommends a CPAP machine, try to be patient about using it. The mask might seem uncomfortable to wear at first, and the machine might seem noisy, but using the machine can really pay off. People with sleep apnea who use a CPAP machine feel more rested and generally feel better.  There is also another device that you wear in your mouth called an \"oral appliance\" or \"mandibular advancement device.\" It also helps keep your airway open while you sleep.  In rare cases, when nothing else helps, doctors recommend surgery to keep the airway open. Surgery to do this is not always effective, and even when it is, the problem can come back.  Is sleep apnea dangerous? -- It can be. People with sleep apnea do not get good-quality sleep, so they are often tired and not alert. This puts them at risk for car accidents and other types of accidents. Plus, studies show that people with sleep apnea are more likely than others to have high blood pressure, heart attacks, " and other serious heart problems. In people with severe sleep apnea, getting treated (for example, with a CPAP machine) can help prevent some of these problems.    GRAPHICS  Airway in a person with sleep apnea    Normally when a person sleeps, the airway remains open, and air can pass from the nose and mouth to the lungs. In a person with sleep apnea, parts of the throat and mouth drop into the airway and block off the flow of air. This can cause loud snoring and interrupt breathing for short periods.  Graphic 24343 Version 5.0    Continuous positive airway pressure (CPAP) for sleep apnea    The CPAP mask gently blows air into your nose while you sleep. It puts just enough pressure on your airway to keep it from closing. The mask in this picture fits over just the nose. Other CPAP devices have masks that fit over the nose and mouth.     Pressure Desensitization Protocol    1.  Put the mask on your face without putting the straps on while doing an activity that you enjoy such as watching TV, listening to the radio, surfing the Internet, or reading.  Try to do this for 15 minutes a day for one week.    2.  Put the mask on your face with the straps on while doing activity that you enjoy.  Try to do this for 15 minutes a day for another week.    3.  Put the mask on and attach the hose to the machine and turn on the machine.  Try to focus on activities that you enjoy for 15 minutes.  Perform this activity for one week.    4.  The mask on and attach the hose in the machine while doing an activity as you enjoy for 30 minutes.  Try this for one week.    5.  Repeat step 4.  Until you can increase the hours of usage to 2 hours.    May go back to previous steps if there is any discomfort at any time.  Also try to sleep with the machine every night for as long as you can tolerate it.    Please bring your machine, mask, hose and power cord on the next clinical visit with me. Thank you.

## 2021-06-19 NOTE — LETTER
Letter by Juan Ramon Martinez MD at      Author: Juan Ramon Martinez MD Service: -- Author Type: --    Filed:  Encounter Date: 4/4/2019 Status: (Other)         May 6, 2019    Chandler Dubon  [address]  [place] [address]      Dear Chandler,      As a valued [facility] patient, your healthcare needs are our priority.  Your primary care provider requested that we reach out to you as we have been unable to reach your by phone, as upon review of your chart for diabetes management, we noticed that you are overdue for your diabetes labs and a follow up with your primary care provider. Please call our office and schedule a diabetic follow up with your primary care provider at your earliest convenience.     To help prevent delays in your care, please call the [facility] at [phone].    We look forward to partnering with you to achieve optimal health and wellbeing.    Sincerely,  Clinical Staff at [facility]     Thank you,  Juan Ramon Martinez MD

## 2021-06-20 NOTE — PROGRESS NOTES
Dear  Juan Ramon Martinez Md  [address] [place] [address]    Thank you for the opportunity to participate in the care of  Chandler Dubon.    He is a 48 y.o. y/o who comes to the clinic for evaluation of several sleep problems.    He reports that he is not feeling refreshed in the morning despite allowing sufficient time to rest. He feels very tired in the morning and this tiredness tends to get worse as the day progresses. He sleeps between 6 and 7 hours during weekdays and then sleeps until 9 AM on weekends. He has been dealing with this problem since he was in [employer].      The patient reports snoring that started more than 15 years ago. The snoring is very loud.  The snoring happens every night night .  He thinks that the snoring has been getting worse over time.     Associated symptoms:  Morning Headache:no  Dry mouth:yes  Witnessed apneas:yes  Daytime sleepiness:yes    ESS: 12  STOP BAN/8    Rooming 2018   Difficulty falling asleep No   Difficulty staying asleep Yes   Excessive daytime tiredness Yes   Excessive daytime sleepiness Yes   Dozing off while driving No   Shift Worker No   Sleep Walking? No   Sleep Talking? No   Kicking or punching? No   Restless legs symptoms No       Past Medical History  Past Medical History:   Diagnosis Date     HTN (hypertension)      Obesity      Osteoarthritis, knee      Tobacco dependence         Past Surgical History  Past Surgical History:   Procedure Laterality Date     KNEE SURGERY Left     tendon and patella repair        Meds  Current Outpatient Prescriptions   Medication Sig Dispense Refill     diclofenac (VOLTAREN) 75 MG EC tablet Take 75 mg by mouth 2 (two) times a day.       diclofenac (VOLTAREN) 75 MG EC tablet Take 1 tablet (75 mg total) by mouth 2 (two) times a day. 180 tablet 1     No current facility-administered medications for this visit.         Allergies  Review of patient's allergies indicates no known allergies.     Social  "History  Social History     Social History     Marital status:      Spouse name: N/A     Number of children: N/A     Years of education: N/A     Occupational History     Not on file.     Social History Main Topics     Smoking status: Current Every Day Smoker     Packs/day: 0.50     Years: 27.00     Smokeless tobacco: Never Used     Alcohol use Yes      Comment: maybe 2 drinks/week      Drug use: No     Sexual activity: Yes     Partners: Female     Birth control/ protection: Post-menopausal     Other Topics Concern     Not on file     Social History Narrative    Lives in a house. 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  No kids.         Family History  Family History   Problem Relation Age of Onset     Dementia Mother      Diabetes type II Mother      Cirrhosis Father      No Medical Problems Sister      No Medical Problems Brother      Diabetes type II Maternal Uncle            Review of Systems:  Constitutional: Negative except as noted in HPI.   Eyes: Negative except as noted in HPI.   ENT: Negative except as noted in HPI.   Cardiovascular: Negative except as noted in HPI.   Respiratory: Negative except as noted in HPI.   Gastrointestinal: Negative except as noted in HPI.   Genitourinary: Negative except as noted in HPI.   Musculoskeletal: Negative except as noted in HPI.   Integumentary: Negative except as noted in HPI.   Neurological: Negative except as noted in HPI.   Psychiatric: Negative except as noted in HPI.   Endocrine: Negative except as noted in HPI.   Hematologic/Lymphatic: Negative except as noted in HPI.      Physical Exam:  BP (!) 149/91  Pulse 90  Ht 6' 1\" (1.854 m)  Wt (!) 287 lb (130.2 kg)  SpO2 98%  BMI 37.87 kg/m2  BMI:Body mass index is 37.87 kg/(m^2).   GEN: NAD, obese  Head: Normocephalic.  EYES: PERRLA, EOMI  ENT: Oropharynx is clear, Mallampatti class IV airway. Uvula is edematous  Nasal mucosa is pink  Neurological: " Alert, oriented to time, place, and person.  Psych:  normal mood, normal affect     Labs/Studies:     Lab Results   Component Value Date    WBC 6.1 02/20/2018    HGB 15.8 02/20/2018    HCT 45.9 02/20/2018    MCV 89 02/20/2018     02/20/2018         Chemistry        Component Value Date/Time     08/27/2018 1415    K 4.3 08/27/2018 1415     08/27/2018 1415    CO2 26 08/27/2018 1415    BUN 14 08/27/2018 1415    CREATININE 1.01 08/27/2018 1415     (H) 08/27/2018 1415        Component Value Date/Time    CALCIUM 9.8 08/27/2018 1415    ALKPHOS 86 08/27/2018 1415    AST 29 08/27/2018 1415    ALT 46 (H) 08/27/2018 1415    BILITOT 0.5 08/27/2018 1415            No results found for: FERRITIN  Lab Results   Component Value Date    TSH 2.35 02/02/2017     Lab Results   Component Value Date    HGBA1C 8.5 (H) 08/27/2018             Assessment and Plan:  In summary Chandler Dubon is a 48 y.o. year old male here for consultation.  1. Snoring/ hypersomnia/risk for sleep  Chandler Dubon has high risk for obstructive sleep apnea based on the results of his STOP BANG questionnaire, ESS, and crowded airway.  We had an extensive conversation to review the entity of sleep apnea and differences between snoring and sleep apnea.   We reviewed the risks associated with sleep apnea, including increased cardiovascular risk and overall death. We talked about treatment options in general.  Recommend getting an overnight polysomnography to split per protocol.  The patient should return to the clinic to discuss results and treatment option in a patient-centered approach.       Patient verbalized understanding of these issues, agrees with the plan and all questions were answered today. Patient was given an opportuntity to voice any other symptoms or concerns not listed above. Patient did not have any other symptoms or concerns.         Ambrocio Martinez MD  ABI Board Certified in Internal Medicine and Sleep  Formerly [facility].

## 2021-06-20 NOTE — PROGRESS NOTES
ASSESSMENT AND PLAN:    1. Sleep symptoms  History is very suggestive of AVA.  Will refer to Sleep medicine.     2. Morbid obesity (H)  History strongly suggests AVA.  Will refer.  We discussed weight loss, efforts via healthy diet.    - Ambulatory referral to Sleep Medicine    3. Urinary frequency  Suspect symptomatic diabetes  Mellitus type 2.    - Urinalysis-UC if Indicated    4. Tobacco abuse  Urged to taper prior to quitting.      5. Abnormal Liver Function Test  Possibly this is hepatic steatosis, related to metabolic syndrome, and/or untreated type 2 diabetes.   - Comprehensive Metabolic Panel    6. Essential hypertension  Start lisinopril 10 mg po daily.     7. Hyperglycemia  - Glycosylated Hemoglobin A1c  - Inpatient consult to Diabetes educator    8. Vitamin D deficiency  History of low level.  Recommended that he take 1000 IU Vitamin D3 daily.     9. Knee osteoarthritis  Symptoms are stable.  He uses voltaren 75 mg po once or twice every other day, prn.     We discussed that he has a high risk profile, particularly if he does have diabetes, likely has hyperlipidemia, obesity, cigarette smoking, and probably AVA.     CHIEF COMPLAINT:  Chief Complaint   Patient presents with     Insomnia     snoring, stop breathing     HISTORY OF PRESENT ILLNESS:  Chandler Dubon is a 48 y.o. male reports snoring, poor sleep, somnolence during the day, and feeling a lack of good sleep. This is chronic.  He also notes polyuria with some urgency at times, without dysuria or hematuria.  He continues to smoke.  Walks a lot at work and with his dog without chest pain or unusual dyspnea.  No other exercise.  Weight has been stable. Does not take vitamin D, though level has been low in the past. No recent fever, or chills.      REVIEW OF SYSTEMS:   See HPI, all other pertinent systems on review are negative.    Active Ambulatory Problems     Diagnosis Date Noted     Non morbid obesity due to excess calories      Essential  Hypertension      Abnormal Liver Function Test      Pseudogout 10/13/2016     Venous hypertension of both lower extremities 10/13/2016     Peripheral neuropathy (H) 10/13/2016     Hyperglycemia, unspecified 10/13/2016     Vitamin D deficiency 10/13/2016     Tobacco abuse 10/13/2016     Chronic pain of both knees 08/16/2017     Chondrocalcinosis 08/16/2017     Morbid obesity (H) 08/27/2018     Diabetes mellitus, type 2 (H) 08/27/2018     Resolved Ambulatory Problems     Diagnosis Date Noted     Edema      Arthralgia      Hematuria      Past Medical History:   Diagnosis Date     HTN (hypertension)      Obesity      Osteoarthritis, knee      Tobacco dependence      Past Surgical History:   Procedure Laterality Date     KNEE SURGERY Left 1980    tendon and patella repair     VITALS:  Vitals:    08/27/18 1341 08/27/18 1344   BP: (!) 158/92 152/90   Patient Site: Left Arm Left Arm   Patient Position: Sitting Sitting   Cuff Size: Adult Large Adult Large   Pulse: 94    Temp: 98.2  F (36.8  C)    TempSrc: Oral    Weight: (!) 289 lb 4.8 oz (131.2 kg)      Wt Readings from Last 3 Encounters:   08/27/18 (!) 289 lb 4.8 oz (131.2 kg)   02/20/18 (!) 285 lb (129.3 kg)   08/16/17 (!) 285 lb (129.3 kg)     PHYSICAL EXAM:  Constitutional:  Well appearing in NAD, alert and oriented  Neck:  Supple, no significant adenopathy.  Cardiac:  S1 S2 without murmur, rhythm regular  Lungs: Clear to auscultation, without wheezes or rales  Abdomen:   Soft, flat and non-tender, without  guarding, rebound, or mass.    Extremities: no inflammation, 1+ bipedal edema    Current Outpatient Prescriptions   Medication Sig Dispense Refill     diclofenac (VOLTAREN) 75 MG EC tablet Take 1 tablet (75 mg total) by mouth 2 (two) times a day. 180 tablet 1     lisinopril (PRINIVIL,ZESTRIL) 10 MG tablet Take 1 tablet (10 mg total) by mouth daily. 30 tablet 11     No current facility-administered medications for this visit.      Juan Ramon Martinez MD  Internal  Medicine  [facility]

## 2021-06-21 NOTE — PROGRESS NOTES
Patient was offered choice of vendor and chose [facility].  Patient Chandler Dubon was set up at [facility] on 11/9/18. Patient received a Resmed [id number] Auto. Pressures were set at 6.   Patient s ramp is 6 cm H2O for Auto and FLEX/EPR is EPR 2.  Patient received a Resmed Mask name: Airfit P10  pillow Size med, heated tubing and heated humidifier.    Pacee Her

## 2021-06-21 NOTE — PROGRESS NOTES
Dear Velasquez Templeton MD  No address on file,    Thank you for the opportunity to participate in the care of Chandler Dubon.     He is a 48 y.o. y/o male patient who comes to the sleep medicine clinic for follow up.    We had an extensive conversation to review the results of his sleep study.    The overnight polysomnography was completed with a digital sleep system using the international 10-20 electrode placement for recording EEG, EOG, EMG from chin, ECG, respiratory effort, oximetry, body position, airflow, nasal pressure, snoring sound, pulse rate and limb movement channels.    The study was completed as a split night study.    1. During the diagnostic portion of the study respiratory monitoring showed severe obstructive sleep apnea/hypopnea (AHI=45.6).  2. A trial of nasal CPAP was initiated given the severity of sleep-disordered breathing and CPAP of 6 cwp was effective at eliminating obstructive events.    3. This was an optimal titration study as demonstrated by the reduction in AHI and sampling of at least a 15-min of supine REM sleep.    We reviewed the oxygen saturation graph as well as the result tables from the report.      Past Medical History:   Diagnosis Date     HTN (hypertension)      Obesity      Osteoarthritis, knee      Tobacco dependence        Past Surgical History:   Procedure Laterality Date     KNEE SURGERY Left 1980    tendon and patella repair       Social History     Social History     Marital status:      Spouse name: N/A     Number of children: N/A     Years of education: N/A     Occupational History     Not on file.     Social History Main Topics     Smoking status: Current Every Day Smoker     Packs/day: 0.50     Years: 27.00     Smokeless tobacco: Never Used     Alcohol use Yes      Comment: maybe 2 drinks/week      Drug use: No     Sexual activity: Yes     Partners: Female     Birth control/ protection: Post-menopausal     Other Topics Concern     Not on file     Social  "History Narrative    Lives in a house. 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  No kids.        Family History   Problem Relation Age of Onset     Dementia Mother      Diabetes type II Mother      Cirrhosis Father      No Medical Problems Sister      No Medical Problems Brother      Diabetes type II Maternal Uncle          Review of Systems:  General: No weight gain, no weight loss  Eyes: No vision changes  ENT: No hearing changes  Cardio: No chest pain, no nocturnal dyspnea  Respiratory: No shortness of breath, no cough  Gastrointestinal: No diarrhea, no constipation  Genitourinary: No excessive urination  Tegumentary: No rashes  Neurological: No seizures, no loss of consciousness  Endo: No heat or cold intolerance.    Current Outpatient Prescriptions   Medication Sig Dispense Refill     diclofenac (VOLTAREN) 75 MG EC tablet Take 75 mg by mouth 2 (two) times a day.       diclofenac (VOLTAREN) 75 MG EC tablet Take 1 tablet (75 mg total) by mouth 2 (two) times a day. 180 tablet 1     No current facility-administered medications for this visit.        No Known Allergies    Physical Exam:  Pulse 86  Ht 6' 1\" (1.854 m)  Wt (!) 286 lb (129.7 kg)  SpO2 99%  BMI 37.73 kg/m2  BMI:Body mass index is 37.73 kg/(m^2).   GEN: NAD, obese  Neurological: Alert, oriented to time, place, and person.  Psych: normal mood, normal affect     Labs/Studies:  - We reviewed the results of the overnight PSG as described on the HPI.     Lab Results   Component Value Date    WBC 6.1 02/20/2018    HGB 15.8 02/20/2018    HCT 45.9 02/20/2018    MCV 89 02/20/2018     02/20/2018         Chemistry        Component Value Date/Time     08/27/2018 1415    K 4.3 08/27/2018 1415     08/27/2018 1415    CO2 26 08/27/2018 1415    BUN 14 08/27/2018 1415    CREATININE 1.01 08/27/2018 1415     (H) 08/27/2018 1415        Component Value Date/Time    CALCIUM 9.8 08/27/2018 " 1415    ALKPHOS 86 08/27/2018 1415    AST 29 08/27/2018 1415    ALT 46 (H) 08/27/2018 1415    BILITOT 0.5 08/27/2018 1415             No results found for: FERRITIN    Lab Results   Component Value Date    HGBA1C 8.5 (H) 08/27/2018       Assessment and Plan:  In summary Chandler Dubon is a 48 y.o. year old male here for follow up.  1. Obstructive Sleep Apnea  We had an extensive conversation to review the results of his sleep study and to  him on the importance of treating sleep apnea.   We discussed treatment options including CPAP therapy as the main therapy option and MAD therapy as a less effective alternative.  We will order a CPAP device with a pressure of 6 cwp  He will start using the device as soon as he receives it with the intention to use if for the entire night.  We discussed some tips to increase PAP tolerance as well as the normal curve of adaptation. CPAP is going to provide improved respiratory function during the night but it can cause some sleep disruption that tends to improve with continuous usage.  He should return to the clinic in 10 weeks to review compliance and efficacy monitoring.  We talked about insurance requirements and I encourage the patient to learn the specific details of his health insurance plan regarding durable medical equipment.     Patient verbalized understanding of these issues, agrees with the plan and all questions were answered today. Patient was given an opportuntity to voice any other symptoms or concerns not listed above. Patient did not have any other symptoms or concerns.      MD VALERY Pablo Board Certified in Internal Medicine and Sleep Medicine  [facility].

## 2021-06-21 NOTE — PROGRESS NOTES
Order for Durable Medical Equipment was processed and equipment ordered.     DME provider: [facility]    Date Faxed: 10/31/18    Ordering Provider: Dr. Martinez    Equipment ordered: CPAP    Fax Number: In House/[facility]

## 2021-06-23 NOTE — PROGRESS NOTES
Dear Dr. Templeton, Velasquez [facility] Md  No address on file    Thank you for the opportunity to participate in the care of MrSima Dubon.    He is a 48 y.o. male who comes to the clinic for the review of his sleep study and transfer of care to my service.  The patient was actually diagnosed with severe obstructive sleep apnea on 10/9/18.  His apnea hypopnea index was grossly elevated at 45.6 events per hour with the lowest O2 sat of 83%.  The patient was also found to have periodic limb movement sleep.  The patient was informed of the results by phone and offer the option to initiate CPAP therapy which he agreed to.  However he has been having some issues with his CPAP pressure feels that it may be a bit too low.  He has not noticed any dramatic improvement in his sleep quality or energy level.     Ideal Sleep-Wake Cycle(devoid of societal pressure):    Patient would try to initiate sleep at around 11-midnight with a sleep latency of less than 5 minutes. The patient would have 3-4 awakening. Final wake up time is around 6:30-7AM.    Compliance Download data for 30 days:  Pressure settin CWP  Residual AHI: 2.7 events per hour  Leak: Minimal  Compliance: 0  Mask Tolerance: Good  Skin irritation: None     Past Medical History  Past Medical History:   Diagnosis Date     HTN (hypertension)      Obesity      Osteoarthritis, knee      Tobacco dependence         Past Surgical History  Past Surgical History:   Procedure Laterality Date     KNEE SURGERY Left     tendon and patella repair        Meds  Current Outpatient Medications   Medication Sig Dispense Refill     diclofenac (VOLTAREN) 75 MG EC tablet Take 75 mg by mouth 2 (two) times a day.       diclofenac (VOLTAREN) 75 MG EC tablet Take 1 tablet (75 mg total) by mouth 2 (two) times a day. 180 tablet 1     No current facility-administered medications for this visit.         Allergies  Patient has no known allergies.     Social History  Social History     Socioeconomic  History     Marital status:      Spouse name: Not on file     Number of children: Not on file     Years of education: Not on file     Highest education level: Not on file   Social Needs     Financial resource strain: Not on file     Food insecurity - worry: Not on file     Food insecurity - inability: Not on file     Transportation needs - medical: Not on file     Transportation needs - non-medical: Not on file   Occupational History     Not on file   Tobacco Use     Smoking status: Current Every Day Smoker     Packs/day: 0.50     Years: 27.00     Pack years: 13.50     Smokeless tobacco: Never Used   Substance and Sexual Activity     Alcohol use: Yes     Comment: maybe 2 drinks/week      Drug use: No     Sexual activity: Yes     Partners: Female     Birth control/protection: Post-menopausal   Other Topics Concern     Not on file   Social History Narrative    Lives in a house. 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  No kids.         Family History  Family History   Problem Relation Age of Onset     Dementia Mother      Diabetes type II Mother      Cirrhosis Father      No Medical Problems Sister      No Medical Problems Brother      Diabetes type II Maternal Uncle         Review of Systems:  Constitutional: Negative except as noted in HPI.   Eyes: Negative except as noted in HPI.   ENT: Negative except as noted in HPI.   Cardiovascular: Negative except as noted in HPI.   Respiratory: Negative except as noted in HPI.   Gastrointestinal: Negative except as noted in HPI.   Genitourinary: Negative except as noted in HPI.   Musculoskeletal: Negative except as noted in HPI.   Integumentary: Negative except as noted in HPI.   Neurological: Negative except as noted in HPI.   Psychiatric: Negative except as noted in HPI.   Endocrine: Negative except as noted in HPI.   Hematologic/Lymphatic: Negative except as noted in HPI.      No flowsheet data  "found.  Epworths Sleepiness Scale 1/10/2019   Sitting and reading 2   Watching TV 2   Sitting, inactive in a public place (e.g. a theatre or a meeting) 0   As a passenger in a car for an hour without a break 1   Lying down to rest in the afternoon when circumstances permit 2   Sitting and talking to someone 0   Sitting quietly after a lunch without alcohol 0   In a car, while stopped for a few minutes in traffic 0   Total score 7     Rooming 9/19/2018   Difficulty falling asleep No   Difficulty staying asleep Yes   Excessive daytime tiredness Yes   Excessive daytime sleepiness Yes   Dozing off while driving No   Shift Worker No   Sleep Walking? No   Sleep Talking? No   Kicking or punching? No   Restless legs symptoms No       Physical Exam:  /76   Pulse 86   Ht 6' 1\" (1.854 m)   Wt (!) 272 lb (123.4 kg)   SpO2 98%   BMI 35.89 kg/m    BMI:Body mass index is 35.89 kg/m .   GEN: NAD, obese  Head: Normocephalic.  EYES: PERRLA, EOMI  ENT: Oropharynx is clear, mallampatti class 4+ airway.   Nasal mucosa is moist without erythema  Neck : Thyroid is within normal limits.  CV: Regular rate and rhythm, S1 & S2 positive.  LUNGS: Bilateral breathsounds heard.   ABDOMEN: Positive bowel sounds in all quadrants, soft, no rebound or guarding  MUSCULOSKELETAL: Bilateral trace leg swelling  SKIN: warm, dry, no rashes  Neurological: Alert, oriented to time, place, and person.  Psych: normal mood, normal affect     Labs/Studies:     Lab Results   Component Value Date    WBC 6.1 02/20/2018    HGB 15.8 02/20/2018    HCT 45.9 02/20/2018    MCV 89 02/20/2018     02/20/2018         Chemistry        Component Value Date/Time     08/27/2018 1415    K 4.3 08/27/2018 1415     08/27/2018 1415    CO2 26 08/27/2018 1415    BUN 14 08/27/2018 1415    CREATININE 1.01 08/27/2018 1415     (H) 08/27/2018 1415        Component Value Date/Time    CALCIUM 9.8 08/27/2018 1415    ALKPHOS 86 08/27/2018 1415    AST 29 " 08/27/2018 1415    ALT 46 (H) 08/27/2018 1415    BILITOT 0.5 08/27/2018 1415            No results found for: FERRITIN  Lab Results   Component Value Date    TSH 2.35 02/02/2017         Assessment and Plan:  In summary Chandler Dubon is a 48 y.o. year old male here for transfer of care.  1.  Obstructive sleep apnea on CPAP  I reviewed the results of sleep study with the patient in detail.  I will change his CPAP pressure to APAP at 6-16 CWP.  I also educated the patient on pressure desensitization protocol and will give him a handout on this topic.  2.  Hypersomnia  3.  Periodic limb movement sleep  Most likely will resolve after optimal pressure therapy.    Patient verbalized understanding of these issues, agrees with the plan and all questions were answered today. Patient was given an opportuntity to voice any other symptoms or concerns not listed above. Patient did not have any other symptoms or concerns.         Fan Harry DO  Board Certified in Internal Medicine and Sleep Medicine  [facility].    (Note created with Dragon voice recognition and unintended spelling errors and word substitutions may occur)

## 2021-08-03 PROBLEM — E66.01 MORBID OBESITY (H): Status: RESOLVED | Noted: 2018-08-27 | Resolved: 2020-10-22

## 2021-09-25 ENCOUNTER — HEALTH MAINTENANCE LETTER (OUTPATIENT)
Age: 51
End: 2021-09-25

## 2022-01-15 ENCOUNTER — HEALTH MAINTENANCE LETTER (OUTPATIENT)
Age: 52
End: 2022-01-15

## 2022-08-27 ENCOUNTER — HEALTH MAINTENANCE LETTER (OUTPATIENT)
Age: 52
End: 2022-08-27

## 2023-01-07 ENCOUNTER — HEALTH MAINTENANCE LETTER (OUTPATIENT)
Age: 53
End: 2023-01-07

## 2023-04-22 ENCOUNTER — HEALTH MAINTENANCE LETTER (OUTPATIENT)
Age: 53
End: 2023-04-22

## 2025-04-25 NOTE — TELEPHONE ENCOUNTER
Patient Admission type:  Inpatient        [facility] Hospitalist Progress Note      Subjective  Tolerating diet.     Diet:   Dietary Orders (From admission, onward)       Start     Ordered    04/25/25 1024  Cholesterol Low Saturated Fat Low, Sodium 2 gm (Low Sodium); Yes, Medical Nutrition Management by RD (Registered Dietitian) Diet  DIET EFFECTIVE NOW        References:    [facility] Food and Nutrition Services Medical Nutrition Therapy   Question Answer Comment   Diet Modifiers Cholesterol Low Saturated Fat Low    Diet Modifiers Sodium 2 gm (Low Sodium)    Medical Nutrition Management by RD (Registered Dietitian) Yes, Medical Nutrition Management by RD (Registered Dietitian)        04/25/25 1024                     Activity:   Physical Activity: Not on File (8/15/2024)    Received from [facility]    Physical "[facility]"     Physical Activity: 0          Objective:  - Vital Signs    Vital Last Value 24 Hour Range   Temperature 97.9 °F (36.6 °C) (04/25/25 0608) Temp  Min: 97.5 °F (36.4 °C)  Max: 98.2 °F (36.8 °C)   Pulse 92 (04/25/25 0608) Pulse  Min: 86  Max: 108   Respiratory 16 (04/25/25 0931) Resp  Min: 14  Max: 29   Blood Pressure (Non-Invasive) 135/81 (04/25/25 0608) BP  Min: 111/70  Max: 168/106   Pulse Oximetry 97 % (04/25/25 0608) SpO2  Min: 90 %  Max: 100 %   Arterial Blood Pressure   No data recorded     - I/O's    Intake/Output Summary (Last 24 hours) at 4/25/2025 1247  Last data filed at 4/25/2025 1054  Gross per 24 hour   Intake 3036.4 ml   Output 3225 ml   Net -188.6 ml         - Physical Exam  General: alert, calm  Eyes: No jaundice. No palor.   Lungs: Clear to auscultation bilaterally  Heart: regular rate and rhythm.   Abdomen: not tender, not distended, positive bowel sounds  Lower limbs: no edema.       - Labs     Recent Results (from the past 48 hours)   GLUCOSE, BEDSIDE - POINT OF CARE    Collection Time: 04/23/25  5:14 PM    Specimen: Blood   Result Value Ref Range    GLUCOSE, BEDSIDE - POINT OF CARE 203  Please call pt to schedule DM follow up with PCP   (H) 70 - 99 mg/dL   GLUCOSE, BEDSIDE - POINT OF CARE    Collection Time: 04/23/25  8:28 PM    Specimen: Blood   Result Value Ref Range    GLUCOSE, BEDSIDE - POINT OF CARE 170 (H) 70 - 99 mg/dL   Comprehensive Metabolic Panel    Collection Time: 04/24/25  5:18 AM    Specimen: Blood, Venous   Result Value Ref Range    Fasting Status      Sodium 141 135 - 145 mmol/L    Potassium 4.0 3.4 - 5.1 mmol/L    Chloride 104 97 - 110 mmol/L    Carbon Dioxide 28 21 - 32 mmol/L    Anion Gap 13 7 - 19 mmol/L    Glucose 119 (H) 70 - 99 mg/dL    BUN 22 (H) 6 - 20 mg/dL    Creatinine 1.78 (H) 0.67 - 1.17 mg/dL    Glomerular Filtration Rate 49 (L) >=60    BUN/Cr 12 7 - 25    Calcium 8.6 8.4 - 10.2 mg/dL    Bilirubin, Total 0.4 0.2 - 1.0 mg/dL    GOT/AST 13 <=37 Units/L    GPT/ALT 30 <64 Units/L    Alkaline Phosphatase 64 45 - 117 Units/L    Albumin 3.3 (L) 3.4 - 5.0 g/dL    Protein, Total 6.4 6.4 - 8.2 g/dL    Globulin 3.1 2.0 - 4.0 g/dL    A/G Ratio 1.1 1.0 - 2.4   CBC with Automated Differential (performable only)    Collection Time: 04/24/25  5:18 AM    Specimen: Blood, Venous   Result Value Ref Range    WBC 8.6 4.2 - 11.0 K/mcL    RBC 3.57 (L) 4.50 - 5.90 mil/mcL    HGB 10.5 (L) 13.0 - 17.0 g/dL    HCT 31.5 (L) 39.0 - 51.0 %    MCV 88.2 78.0 - 100.0 fl    MCH 29.4 26.0 - 34.0 pg    MCHC 33.3 32.0 - 36.5 g/dL    RDW-CV 11.8 11.0 - 15.0 %    RDW-SD 37.9 (L) 39.0 - 50.0 fL     140 - 450 K/mcL    NRBC 0 <=0 /100 WBC    Neutrophil, Percent 60 %    Lymphocytes, Percent 29 %    Mono, Percent 6 %    Eosinophils, Percent 4 %    Basophils, Percent 1 %    Immature Granulocytes 0 %    Absolute Neutrophils 5.1 1.8 - 7.7 K/mcL    Absolute Lymphocytes 2.5 1.0 - 4.8 K/mcL    Absolute Monocytes 0.5 0.3 - 0.9 K/mcL    Absolute Eosinophils  0.4 0.0 - 0.5 K/mcL    Absolute Basophils 0.1 0.0 - 0.3 K/mcL    Absolute Immature Granulocytes 0.0 0.0 - 0.2 K/mcL   Magnesium    Collection Time: 04/24/25  5:18 AM    Specimen: Blood, Venous   Result  Value Ref Range    Magnesium 1.9 1.7 - 2.4 mg/dL   Phosphorus    Collection Time: 04/24/25  5:18 AM    Specimen: Blood, Venous   Result Value Ref Range    Phosphorus 5.0 (H) 2.4 - 4.7 mg/dL   GLUCOSE, BEDSIDE - POINT OF CARE    Collection Time: 04/24/25  8:00 AM    Specimen: Blood   Result Value Ref Range    GLUCOSE, BEDSIDE - POINT OF CARE 131 (H) 70 - 99 mg/dL   GLUCOSE, BEDSIDE - POINT OF CARE    Collection Time: 04/24/25 12:00 PM    Specimen: Blood   Result Value Ref Range    GLUCOSE, BEDSIDE - POINT OF CARE 112 (H) 70 - 99 mg/dL   GLUCOSE, BEDSIDE - POINT OF CARE    Collection Time: 04/24/25  3:30 PM    Specimen: Blood   Result Value Ref Range    GLUCOSE, BEDSIDE - POINT OF CARE 117 (H) 70 - 99 mg/dL   GLUCOSE, BEDSIDE - POINT OF CARE    Collection Time: 04/24/25  6:49 PM    Specimen: Blood   Result Value Ref Range    GLUCOSE, BEDSIDE - POINT OF CARE 100 (H) 70 - 99 mg/dL   GLUCOSE, BEDSIDE - POINT OF CARE    Collection Time: 04/24/25  9:28 PM    Specimen: Blood   Result Value Ref Range    GLUCOSE, BEDSIDE - POINT OF CARE 129 (H) 70 - 99 mg/dL   CBC with Automated Differential (performable only)    Collection Time: 04/25/25  4:38 AM    Specimen: Blood, Venous   Result Value Ref Range    WBC 9.8 4.2 - 11.0 K/mcL    RBC 3.25 (L) 4.50 - 5.90 mil/mcL    HGB 9.5 (L) 13.0 - 17.0 g/dL    HCT 28.8 (L) 39.0 - 51.0 %    MCV 88.6 78.0 - 100.0 fl    MCH 29.2 26.0 - 34.0 pg    MCHC 33.0 32.0 - 36.5 g/dL    RDW-CV 11.9 11.0 - 15.0 %    RDW-SD 38.5 (L) 39.0 - 50.0 fL     140 - 450 K/mcL    NRBC 0 <=0 /100 WBC    Neutrophil, Percent 74 %    Lymphocytes, Percent 17 %    Mono, Percent 6 %    Eosinophils, Percent 2 %    Basophils, Percent 1 %    Immature Granulocytes 0 %    Absolute Neutrophils 7.3 1.8 - 7.7 K/mcL    Absolute Lymphocytes 1.6 1.0 - 4.8 K/mcL    Absolute Monocytes 0.6 0.3 - 0.9 K/mcL    Absolute Eosinophils  0.2 0.0 - 0.5 K/mcL    Absolute Basophils 0.1 0.0 - 0.3 K/mcL    Absolute Immature Granulocytes 0.0  0.0 - 0.2 K/mcL   Comprehensive Metabolic Panel    Collection Time: 04/25/25  4:38 AM    Specimen: Blood, Venous   Result Value Ref Range    Fasting Status      Sodium 139 135 - 145 mmol/L    Potassium 3.9 3.4 - 5.1 mmol/L    Chloride 105 97 - 110 mmol/L    Carbon Dioxide 23 21 - 32 mmol/L    Anion Gap 15 7 - 19 mmol/L    Glucose 130 (H) 70 - 99 mg/dL    BUN 16 6 - 20 mg/dL    Creatinine 1.62 (H) 0.67 - 1.17 mg/dL    Glomerular Filtration Rate 54 (L) >=60    BUN/Cr 10 7 - 25    Calcium 8.3 (L) 8.4 - 10.2 mg/dL    Bilirubin, Total 0.4 0.2 - 1.0 mg/dL    GOT/AST 14 <=37 Units/L    GPT/ALT 22 <64 Units/L    Alkaline Phosphatase 60 45 - 117 Units/L    Albumin 3.0 (L) 3.4 - 5.0 g/dL    Protein, Total 6.0 (L) 6.4 - 8.2 g/dL    Globulin 3.0 2.0 - 4.0 g/dL    A/G Ratio 1.0 1.0 - 2.4   GLUCOSE, BEDSIDE - POINT OF CARE    Collection Time: 04/25/25  7:34 AM    Specimen: Blood   Result Value Ref Range    GLUCOSE, BEDSIDE - POINT OF CARE 115 (H) 70 - 99 mg/dL   GLUCOSE, BEDSIDE - POINT OF CARE    Collection Time: 04/25/25 11:19 AM    Specimen: Blood   Result Value Ref Range    GLUCOSE, BEDSIDE - POINT OF CARE 131 (H) 70 - 99 mg/dL         Recent Labs   Lab 04/25/25  0438 04/24/25  0518 04/23/25  0503 04/22/25  1146   SODIUM 139 141 138 135   POTASSIUM 3.9 4.0 4.6 4.9   CHLORIDE 105 104 104 99   CO2 23 28 29 29   BUN 16 22* 25* 27*   CREATININE 1.62* 1.78* 1.68* 1.44*   GLUCOSE 130* 119* 161* 287*   ALBUMIN 3.0* 3.3*  --  4.0   PHOS  --  5.0*  --   --    AST 14 13  --  16   BILIRUBIN 0.4 0.4  --  0.4         - Imaging    IR DRAINAGE TUBE CHANGE   Final Result   Slight retraction of cholecystostomy tube which remains in   position. However, the patient underwent routine exchange and repositioning   of tube as he was due for this since it was initially placed 4 months ago.      PLAN: Patient should follow-up with general surgery to consider   cholecystectomy.    ___________________________________________________________________________   ______________________________________      PROCEDURES PERFORMED:   Cholangiogram Through An Existing Access   Fluoroscopic Guided Cholecystostomy Tube exchange.      ANESTHESIA/SEDATION: Local      PROPHYLACTIC ANTIBIOTIC: None      PREPARATION: The patient was placed supine on the fluoroscopy table.   Discussion of Risks, Benefits and Alternatives completed with   patient/authorized decision maker. Additionally, potential problems related   to recuperation, likelihood of achieving care, treatment and service goals   were discussed. Relevant risks, benefits and side effects related to   alternatives, including the possible results of not receiving care,   treatment and services discussed. When indicated, any limitations on the   confidentiality of information learned from or about the patient were   explained. All patient/authorized decision maker questions answered and   consent for procedure was provided. The patient's identification, correct   procedure and position were verified. The appropriate site was verified and   marked as appropriate. Equipment/Implants were checked for functionality   and availability.      PROCEDURE:   CHOLANGIOGRAM THROUGH AN EXISTING ACCESS: A  radiograph was obtained.    Contrast was injected through the patient's indwelling catheter. Multiple   fluoroscopic images were obtained in different projections.        Fluoroscopic guided cholecystostomy tube exchange: A stiff Amplatz wire was   advanced through the tube after it was cut. The wire was coiled within the   gallbladder lumen and the tube was removed over the wire. A new 10.2 Yi   cholecystostomy tube was advanced over the wire and its cope loop was   formed within the gallbladder lumen. Subsequent injection confirmed   satisfactory position. The wire was removed. The tube was sutured in place.   Sterile dressing and gravity bag were  applied.      FINDINGS:   Slight retraction of cholecystostomy tube. Gallbladder is nondilated.   Cystic duct is patent. Final image demonstrates satisfactory repositioning   of the newly exchanged cholecystostomy tube.      SPECIMENS REMOVED: None      COMPLICATIONS: No immediate complications.      ESTIMATED BLOOD LOSS: Minimal      RADIATION/CONTRAST DOSE:   FLUOROSCOPY TIME: 1.8 minutes.   CUMULATIVE AIR KERMA: 8.73 mGy   CUMULATIVE DOSE AREA PRODUCT: 25.23 mGy-cm2   CONTRAST DOSE: 10 cc         Electronically Signed by: GEORGE BEHRENS, M.D.    Signed on: 4/23/2025 3:42 PM    Workstation ID: [id number]      CT ABDOMEN PELVIS W CONTRAST   Final Result   1.  Cholecystostomy tube noted in the distended gallbladder which appears   mildly inflamed. No calcified stones visible. Mild inflammation in the   mesentery along the cholecystostomy tube. Findings concerning for acute   cholecystitis. Correlate with tube drainage. If additional imaging is   clinically warranted, HIDA scan is the modality of choice.   2.  Findings concerning for cystitis. Correlate with urinalysis.      Electronically Signed by: DARIANA FALLON MD    Signed on: 4/22/2025 1:18 PM    Workstation ID: [id number]             Microbiology Results       None          I personally reviewed the patient's imaging and agree with the radiology/imaging report(s) above.     No results found for this or any previous visit (from the past 4464 hours).     Current Meds  Current Facility-Administered Medications   Medication Dose Route Frequency Provider Last Rate Last Admin    sodium chloride 0.9% infusion   Intravenous Continuous Po Mendez MD 42 mL/hr at 04/25/25 1054 New Bag at 04/25/25 1054    HYDROmorphone (DILAUDID) injection 0.5 mg  0.5 mg Intravenous Q4H PRN Rufina Sher MD   0.5 mg at 04/25/25 0351    prochlorperazine (COMPAZINE) injection 5 mg  5 mg Intravenous Q6H PRN Meena Morelos MD   5 mg at 04/24/25 0612    sodium chloride 0.9 %  injection 10 mL  10 mL Intravenous PRN Meena Morelos MD        sodium chloride 0.9 % injection 2 mL  2 mL Intracatheter 2 times per day Meena Morelos MD   2 mL at 04/25/25 0832    Potassium Standard Replacement Protocol (Levels 3.5 and lower)   Does not apply See Admin Instructions Meena Morelos MD        Potassium Replacement (Levels 3.6 - 4)   Does not apply See Admin Instructions Meena Morelos MD        Magnesium Standard Replacement Protocol   Does not apply See Admin Instructions Meena Morelos MD        Phosphorus Standard Replacement Protocol   Does not apply See Admin Instructions Meena Morelos MD        aluminum-magnesium hydroxide-simethicone (MAALOX) 200-200-20 MG/5ML suspension 30 mL  30 mL Oral Q4H PRN Meena Morelos MD        ondansetron (ZOFRAN ODT) disintegrating tablet 4 mg  4 mg Oral Q12H PRN Meena Morelos MD        Or    ondansetron (ZOFRAN) injection 4 mg  4 mg Intravenous Q12H PRN Meena Morelos MD   4 mg at 04/25/25 0351    HYDROcodone-acetaminophen (NORCO) 5-325 MG per tablet 1 tablet  1 tablet Oral Q4H PRN Meena Morelos MD   1 tablet at 04/25/25 0831    loperamide (IMODIUM) capsule 2 mg  2 mg Oral PRN Meena Morelos MD        dextrose 50 % injection 25 g  25 g Intravenous PRN Meena Morelos MD        dextrose 50 % injection 12.5 g  12.5 g Intravenous PRN Meena Morelos MD        glucagon (GLUCAGEN) injection 1 mg  1 mg Intramuscular PRN Meena Morelos MD        dextrose (GLUTOSE) 40 % gel 15 g  15 g Oral PRN Meena Morelos MD        dextrose (GLUTOSE) 40 % gel 30 g  30 g Oral PRN Meena Morelos MD        insulin lispro (ADMELOG,HumaLOG) - Correction Dose   Subcutaneous TID WC Meena Morelos MD   4 Units at 04/23/25 1820    insulin lispro (ADMELOG,HumaLOG) - Correction Dose   Subcutaneous Nightly Meena Morelos MD   2 Units at 04/22/25 2156          PCP  Nikole Barker NP    Consultants  IP Consult Orders (From admission, onward)       Start     Ordered    04/24/25 1205  Inpatient  consult to Nephrology  ONE TIME        Provider:  Po Mendez MD    04/24/25 1204    04/22/25 1534  Inpatient consult to Infectious Diseases  ONE TIME        Provider:  Urvashi Johnson MD    04/22/25 1536    04/22/25 1430  INPATIENT CONSULT TO IR  ONE TIME        Provider:  Behrens, George, MD    04/22/25 1430    04/22/25 1429  Inpatient consult to General Surgery  ONE TIME        Provider:  Rufina Sher MD    04/22/25 1430                    Problem List  Active Hospital Problems    Diagnosis     CKD (chronic kidney disease)     Epigastric abdominal pain     Acute cholecystitis     Type 2 diabetes mellitus, with long-term current use of insulin (CMD)     Polysubstance abuse (CMD)          Assessment & Plan:    Acute cholecystitis, cholecystostomy tube associated  History of acalculous cholecystitis in 6/24   status post cholecystostomy tube placement  Pain and nausea management  diarrhea is chronic and intermittent per patient  Managed with antibiotics  MRSA nares negative  Cultures ordered   general surgery, IR, ID on consult given presence of cholecystostomy tube     Diabetes Mellitus, type 2, uncontrolled, with hyperglycemia.   Monitor glucose levels.   Insulin sliding scale if needed  Outpatient follow up  A1c is high at 8.8     Chronic kidney disease, stage III  Normocytic anemia, likely of CKD   Creatinine is better than baseline of 1.6-1.77  Monitor BMP     History of urinary retention  Bladder wall thickening and distention  Possibly from chronic urinary retention  Monitored PVRs   UA with trace blood  outpatient follow up recommended     Former polysubstance abuse  Patient reported cessation of drug use  UDS is negative    KATHIE, on 4/24/2025  resolved  nephrology service on consult     History of Prolonged hospitalization in 6/24 for septic shock, bacteremia, severe ARDS   status post trach and PEG placement, thereafter removed   (ECMO evaluation done at [facility] but did not actually end up needing  ECMO)    New Developments:    Stable vital signs  Stable labs  Advancing diet  Discharge planning hopefully tomorrow if diet is tolerated          Nutrition Status:    . Intervention:Coordination of Nutrition care by a nutritional Professional, Nutritional Supplemental therapy. See RD Note for Current recommendations.     DVT Prophylaxis:              Code Status:   Code Status Information       Code Status    Full Resuscitation